# Patient Record
Sex: FEMALE | Race: WHITE | Employment: OTHER | URBAN - NONMETROPOLITAN AREA
[De-identification: names, ages, dates, MRNs, and addresses within clinical notes are randomized per-mention and may not be internally consistent; named-entity substitution may affect disease eponyms.]

---

## 2023-11-12 ENCOUNTER — HOSPITAL ENCOUNTER (EMERGENCY)
Age: 78
Discharge: HOME | End: 2023-11-12
Attending: EMERGENCY MEDICINE
Payer: COMMERCIAL

## 2023-11-12 VITALS
SYSTOLIC BLOOD PRESSURE: 156 MMHG | HEART RATE: 75 BPM | DIASTOLIC BLOOD PRESSURE: 98 MMHG | OXYGEN SATURATION: 97 % | RESPIRATION RATE: 20 BRPM | TEMPERATURE: 98.3 F

## 2023-11-12 DIAGNOSIS — N39.0 URINARY TRACT INFECTION WITHOUT HEMATURIA, SITE UNSPECIFIED: ICD-10-CM

## 2023-11-12 DIAGNOSIS — J06.9 ACUTE UPPER RESPIRATORY INFECTION: Primary | ICD-10-CM

## 2023-11-12 LAB
BACTERIA URNS QL MICRO: ABNORMAL /HPF
BILIRUB UR QL STRIP.AUTO: NEGATIVE
CASTS #/AREA URNS LPF: ABNORMAL /LPF
CASTS 2: ABNORMAL /LPF
CHARACTER UR: ABNORMAL
COLOR: YELLOW
CRYSTALS URNS MICRO: ABNORMAL
EPITHELIAL CELLS, UA: ABNORMAL /HPF
FLUAV RNA RESP QL NAA+PROBE: NOT DETECTED
FLUBV RNA RESP QL NAA+PROBE: NOT DETECTED
GLUCOSE UR QL STRIP.AUTO: NEGATIVE MG/DL
HGB UR QL STRIP.AUTO: ABNORMAL
KETONES UR QL STRIP.AUTO: NEGATIVE
MISCELLANEOUS 2: ABNORMAL
NITRITE UR QL STRIP: NEGATIVE
PH UR STRIP.AUTO: 6 [PH] (ref 5–9)
PROT UR STRIP.AUTO-MCNC: NEGATIVE MG/DL
RBC URINE: ABNORMAL /HPF
RENAL EPI CELLS #/AREA URNS HPF: ABNORMAL /[HPF]
SARS-COV-2 RNA RESP QL NAA+PROBE: NOT DETECTED
SP GR UR REFRACT.AUTO: 1.01 (ref 1–1.03)
UROBILINOGEN, URINE: 0.2 EU/DL (ref 0–1)
WBC #/AREA URNS HPF: ABNORMAL /HPF
WBC #/AREA URNS HPF: ABNORMAL /[HPF]
YEAST LIKE FUNGI URNS QL MICRO: ABNORMAL

## 2023-11-12 PROCEDURE — 87077 CULTURE AEROBIC IDENTIFY: CPT

## 2023-11-12 PROCEDURE — 81001 URINALYSIS AUTO W/SCOPE: CPT

## 2023-11-12 PROCEDURE — 87186 SC STD MICRODIL/AGAR DIL: CPT

## 2023-11-12 PROCEDURE — 99283 EMERGENCY DEPT VISIT LOW MDM: CPT

## 2023-11-12 PROCEDURE — 87086 URINE CULTURE/COLONY COUNT: CPT

## 2023-11-12 PROCEDURE — 87636 SARSCOV2 & INF A&B AMP PRB: CPT

## 2023-11-12 RX ORDER — CEFDINIR 300 MG/1
300 CAPSULE ORAL 2 TIMES DAILY
Qty: 20 CAPSULE | Refills: 0 | Status: SHIPPED | OUTPATIENT
Start: 2023-11-12 | End: 2023-11-12 | Stop reason: SDUPTHER

## 2023-11-12 RX ORDER — CEFDINIR 300 MG/1
300 CAPSULE ORAL 2 TIMES DAILY
Qty: 20 CAPSULE | Refills: 0 | Status: SHIPPED | OUTPATIENT
Start: 2023-11-12 | End: 2023-11-22

## 2023-11-12 NOTE — ED NOTES
Pt to the ED via self. Patient presents with complaints of cough and nasal congestion. Patient states that she had a  on Monday and couldve been exposed to someone sick there. Patient is alert and oriented x 4. Respirations are regular and unlabored. Call light within reach.      Wendy Keenan RN  [phone]

## 2023-11-13 LAB
BACTERIA UR CULT: ABNORMAL
ORGANISM: ABNORMAL

## 2023-11-14 NOTE — PROGRESS NOTES
Pharmacy Note  ED Culture Follow-up    Carmenza Gillis is a 69 y.o. female. Allergies: Sulfa antibiotics     Current antimicrobials:   Reviewed patient's urine culture - culture positive for E.coli. Patient was discharged on cefdinir, and culture is sensitive to prescribed medication. Antibiotic prescribed at discharge is appropriate - no changes made to antibiotic regimen. Please call with any questions.  Jackie Wallace, [address] [place] [address], PharmD 4:54 PM 11/14/2023